# Patient Record
(demographics unavailable — no encounter records)

---

## 2020-03-06 NOTE — EDM.PDOC
ED HPI GENERAL MEDICAL PROBLEM





- General


Chief Complaint: Gastrointestinal Problem


Stated Complaint: VOMITING


Time Seen by Provider: 03/06/20 20:40


Source of Information: Reports: Family





- History of Present Illness


INITIAL COMMENTS - FREE TEXT/NARRATIVE: 


The patient is a healthy 3-year-old male who presents to the ER secondary to 

abdominal pain and vomiting.  The patient's was having some abdominal 

discomfort and intermittent nausea and vomiting since this morning.  In between 

episodes he is playing and otherwise looks fine.  No fevers, no coughing, no 

diarrhea, no lethargy.  He gets a little fussy but he does not appear to be 

having any severe pain, and then he throws up and then he appears fine.  No 

other complaints at this time.  Immunizations are current and there has not 

been any foreign travel.





  ** Abdomen


Pain Score (Numeric/FACES): 4





- Related Data


 Allergies











Allergy/AdvReac Type Severity Reaction Status Date / Time


 


No Known Allergies Allergy   Verified 03/06/20 20:44











Home Meds: 


 Home Meds





Ondansetron [Zofran ODT] 2 mg PO Q4H PRN 5 Days #10 tab.dis 03/06/20 [Rx]











Past Medical History





- Past Health History


Medical/Surgical History: Denies Medical/Surgical History





Social & Family History





- Family History


Family Medical History: Noncontributory





- Tobacco Use


Second Hand Smoke Exposure: No





ED ROS GENERAL





- Review of Systems


Review Of Systems: See Below (Positive for intermittent fussiness, positive for 

nausea and vomiting, positive for abdominal pain, negative for fevers, all 

other Positives and pertinent negatives as per HPI. All other pertinent systems 

were reviewed and are negative)





ED EXAM, GI/ABD





- Physical Exam


Exam: See Below


Text/Narrative:: 








Constitutional:  Well developed, well nourished, no acute distress, non-toxic 

appearance, active and shy 


Eyes:  PERRL, EOMI, conjunctiva normal, nonicteric 


HENT:  Normocephalic, Atraumatic, external ears normal, nose normal, oropharynx 

moist, no pharyngeal exudates, no dental abscess, uvula midline


Neck- normal range of motion, no tenderness, supple 


Respiratory: No respiratory distress, normal breath sounds, no wheezes, rales, 

or rhonchi 


Cardiovascular:  Normal rate, normal rhythm, no murmurs, no gallops, no rubs 


GI:  Soft, nontender, nondistended, normal bowel sounds, no organomegaly, no 

mass, rebound, or guarding, no hernias


: Within normal limits


Back: No costovertebral angle tenderness, FROM


Musculoskeletal:  All 4 extremities present and atraumatic, No edema, no 

tenderness, no deformities


Integument:  Warm, dry, Well hydrated, no rash, color is ethnicity appropriate


Lymphatic:  No lymphadenopathy noted


Neurologic:  Alert and age appropriate, Cranial nerves grossly intact, normal 

motor function, normal sensory function, no focal deficits noted 


Psychiatric:  Speech and behavior age appropriate 








Course





- Vital Signs


Text/Narrative:: 


Multiple etiologies for abdominal pain were considered which includes 

appendicitis, volvulus, intussusception, testicular torsion, etc. however given 

the entire clinical exam and history I do not feel any lab work is warranted at 

this time.  The patient is a completely benign exam and the intermittent fussy 

episodes do not sound like severe intestinal colic that would be associated 

with a volvulus, introsusception, etc.





I talked with the patient's father in detail at this time we will give the 

child Zofran 2 mg orally and a prescription for Zofran.  He is stable for 

discharge and close outpatient follow-up and will return to the ER if warranted.





Last Recorded V/S: 





 Last Vital Signs











Temp  36.2 C   03/06/20 20:42


 


Pulse  112 H  03/06/20 20:42


 


Resp  30   03/06/20 20:42


 


BP      


 


Pulse Ox  98   03/06/20 20:42














- Orders/Labs/Meds


Meds: 





Medications














Discontinued Medications














Generic Name Dose Route Start Last Admin





  Trade Name Freq  PRN Reason Stop Dose Admin


 


Ondansetron HCl  2 mg  03/06/20 20:46  





  Zofran Odt  PO  03/06/20 20:47  





  ONETIME ONE   





     





     





     





     














Departure





- Departure


Time of Disposition: 20:51


Disposition: Home, Self-Care 01


Condition: Good


Clinical Impression: 


 Abdominal pain, Vomiting








- Discharge Information


Prescriptions: 


Ondansetron [Zofran ODT] 2 mg PO Q4H PRN 5 Days #10 tab.dis


 PRN Reason: Nausea/Vomiting


Instructions:  Abdominal Pain, Pediatric, Nausea and Vomiting, Pediatric


Referrals: 


Pineda Katz MD [Primary Care Provider] - 





Sepsis Event Note





- Focused Exam


Vital Signs: 





 Vital Signs











  Temp Pulse Resp Pulse Ox


 


 03/06/20 20:42  36.2 C  112 H  30  98











Date Exam was Performed: 03/06/20


Time Exam was Performed: 20:48